# Patient Record
Sex: FEMALE | Race: WHITE | NOT HISPANIC OR LATINO | Employment: PART TIME | URBAN - METROPOLITAN AREA
[De-identification: names, ages, dates, MRNs, and addresses within clinical notes are randomized per-mention and may not be internally consistent; named-entity substitution may affect disease eponyms.]

---

## 2020-10-23 ENCOUNTER — TELEPHONE (OUTPATIENT)
Dept: OBSTETRICS AND GYNECOLOGY | Facility: CLINIC | Age: 35
End: 2020-10-23

## 2020-10-23 NOTE — TELEPHONE ENCOUNTER
Pt called says she just had a visit but wants to know what prenatal vitamins are recommedned or if you all prescribe prenatals. She would like for someone to call her back.

## 2020-10-30 ENCOUNTER — TELEPHONE (OUTPATIENT)
Dept: OBSTETRICS AND GYNECOLOGY | Facility: CLINIC | Age: 35
End: 2020-10-30

## 2020-10-30 ENCOUNTER — OFFICE VISIT (OUTPATIENT)
Dept: OBSTETRICS AND GYNECOLOGY | Facility: CLINIC | Age: 35
End: 2020-10-30

## 2020-10-30 ENCOUNTER — LAB REQUISITION (OUTPATIENT)
Dept: LAB | Facility: HOSPITAL | Age: 35
End: 2020-10-30

## 2020-10-30 VITALS
DIASTOLIC BLOOD PRESSURE: 64 MMHG | SYSTOLIC BLOOD PRESSURE: 102 MMHG | WEIGHT: 131.9 LBS | BODY MASS INDEX: 24.9 KG/M2 | HEIGHT: 61 IN

## 2020-10-30 DIAGNOSIS — Z00.00 ROUTINE GENERAL MEDICAL EXAMINATION AT A HEALTH CARE FACILITY: ICD-10-CM

## 2020-10-30 DIAGNOSIS — O20.0 THREATENED ABORTION: Primary | ICD-10-CM

## 2020-10-30 LAB — HCG INTACT+B SERPL-ACNC: 5.71 MIU/ML

## 2020-10-30 PROCEDURE — 99214 OFFICE O/P EST MOD 30 MIN: CPT | Performed by: NURSE PRACTITIONER

## 2020-10-30 PROCEDURE — 96372 THER/PROPH/DIAG INJ SC/IM: CPT | Performed by: NURSE PRACTITIONER

## 2020-10-30 PROCEDURE — 84702 CHORIONIC GONADOTROPIN TEST: CPT | Performed by: NURSE PRACTITIONER

## 2020-10-30 RX ORDER — PRENATAL VIT NO.126/IRON/FOLIC 28MG-0.8MG
TABLET ORAL DAILY
COMMUNITY

## 2020-10-30 NOTE — PROGRESS NOTES
"Chief Complaint   Patient presents with   • TAB          HPI  Gwendolyn Parker is a 35 y.o. female, , who presents with bleeding.  The amount of bleeding is described as heavy for 1 days with clots starting this morning. and cramping. She reports that bleeding started off as light spotting last night, and has gotten heavier with clots starting this morning.   LMP 20- 4w6d by LMP    Recent Tests:  She had a urine pregnancy test that was done 1 week ago that was positive..  She has not had a pelvic ultrasound. .  She has had prenatal care.  She complains of cramping pain.  The pain is located in her lower abdomen.. Her past medical history is non-contributory.  She does not have passage of tissue.  Rh Status: Negative.  Rhogam will be given today..  She also complains of chills/night sweats for the last three nights. She reports that she has also been having some mild nausea for the last four days in the mornings and with certain foods.     The additional following portions of the patient's history were reviewed and updated as appropriate: allergies, current medications, past family history, past medical history, past social history, past surgical history and problem list.    Review of Systems  Constitutional POS: night sweats    NEG: chills or fevers   Genitourinary POS: vaginal bleeding and cramping    NEG: nothing reported   Gastointestinal POS: nothing reported    NEG: nothing reported   Integument POS: nothing reported    NEG: nothing reported   All other systems reviewed and are negative.     I have reviewed and agree with the HPI, ROS, and historical information as entered above. Nasrin Turner, APRN    Objective   Physical Exam  /64   Ht 154.9 cm (61\")   Wt 59.8 kg (131 lb 14.4 oz)   LMP 2020   Breastfeeding No   BMI 24.92 kg/m²     General:  well developed; well nourished  no acute distress   Skin:  Not performed.   Lungs:  breathing is unlabored   Heart:  Not performed.   Abdomen: " soft, non-tender; no masses   Pelvis: Not performed.        Assessment and Plan    Problem List Items Addressed This Visit     None      Visit Diagnoses     Threatened     -  Primary    Relevant Orders    Rhogam Immune Globulin Immunization (Completed)    HCG, B-subunit, Quantitative          1. Threatened AB  2. Rhogam given today.   3. [facility] stat today, plan to repeat on Mon am stat  4. Discussed potential outcomes including SAB, ectopic, viable pregnancy but further info is needed.   5. Call for fever, bleeding > 1 pad per hour, severe pain    Counseling was given to patient for the following topics: instructions for management, prognosis and impressions . Total time of the encounter was 15 minutes and 15 minutes was spend counseling.      Nasrin Turner, APRN  10/30/2020

## 2020-10-30 NOTE — TELEPHONE ENCOUNTER
Spoke with pt. lmp 9/26/20, periods regular. C/o light spotting yesterday and now heavy with clotting/cramping.     MBT Oneg.     Apt today with NP for possible labs and rhogam. Pt GILDARDO.

## 2020-11-02 ENCOUNTER — LAB (OUTPATIENT)
Dept: OBSTETRICS AND GYNECOLOGY | Facility: CLINIC | Age: 35
End: 2020-11-02

## 2020-11-02 ENCOUNTER — LAB REQUISITION (OUTPATIENT)
Dept: LAB | Facility: HOSPITAL | Age: 35
End: 2020-11-02

## 2020-11-02 DIAGNOSIS — Z00.00 ROUTINE GENERAL MEDICAL EXAMINATION AT A HEALTH CARE FACILITY: ICD-10-CM

## 2020-11-02 LAB — HCG INTACT+B SERPL-ACNC: 1.01 MIU/ML

## 2020-11-02 PROCEDURE — 84702 CHORIONIC GONADOTROPIN TEST: CPT | Performed by: NURSE PRACTITIONER

## 2020-11-03 ENCOUNTER — TELEPHONE (OUTPATIENT)
Dept: OBSTETRICS AND GYNECOLOGY | Facility: CLINIC | Age: 35
End: 2020-11-03

## 2020-11-03 NOTE — TELEPHONE ENCOUNTER
----- Message from Dianne Narayan sent at 11/28/2018  5:10 PM CST -----  Regarding: BOTOX  BOTOX  NO PRIOR AUTHORIZATION REQUIRED PER PLAN GUIDELINES FOR DIAGNOSIS - G81.10      BUY AND BILL ALLOWED    Patient may be scheduled.   Notified pt HCG down from 5.71 to 1.01. discussed SAB with VINITA Turner. Instructed she will sign off and cb if needs repeat or further instructions.     Pt desires to conceive again-instructed best to see what f/u on labs is first and let herself have a cycle before trying again. Start PNV. Pt VU.

## 2020-11-04 NOTE — TELEPHONE ENCOUNTER
Patient is calling because she is unsure if she is needing to come back for a repeat HCG to ensure its at 0

## 2020-11-04 NOTE — PROGRESS NOTES
Patient notified, will not repeat since so close to <1. Wait for next cycle and then can try to conceive again.

## 2020-11-06 LAB
HCG INTACT+B SERPL-ACNC: 1 MIU/ML
HCG INTACT+B SERPL-ACNC: 6 MIU/ML

## 2021-06-02 ENCOUNTER — OFFICE VISIT (OUTPATIENT)
Dept: OBSTETRICS AND GYNECOLOGY | Facility: CLINIC | Age: 36
End: 2021-06-02

## 2021-06-02 VITALS
DIASTOLIC BLOOD PRESSURE: 78 MMHG | WEIGHT: 134.6 LBS | BODY MASS INDEX: 24.77 KG/M2 | HEIGHT: 62 IN | SYSTOLIC BLOOD PRESSURE: 102 MMHG

## 2021-06-02 DIAGNOSIS — Z01.419 ENCOUNTER FOR ANNUAL ROUTINE GYNECOLOGICAL EXAMINATION: Primary | ICD-10-CM

## 2021-06-02 PROCEDURE — 99395 PREV VISIT EST AGE 18-39: CPT | Performed by: OBSTETRICS & GYNECOLOGY

## 2021-06-02 NOTE — PROGRESS NOTES
GYN Annual Exam     CC - Here for annual exam.     Subjective   HPI  Gwendolyn Parker is a 36 y.o. female, , who presents for annual well woman exam.  Her last LMP was Patient's last menstrual period was 2021 (exact date)..  Periods are regular every 28-30 days, lasting 7 days.  Dysmenorrhea:mild, occurring first 1-2 days of flow.  Patient reports problems with: bloating, breast tenderness, fluid retention, moodiness and pelvic pain.  Partner Status: Marital Status: .  New Partners since last visit: YES/NO/Other: no.  Desires STD Screening: YES/NO/Other: no.     Patient would like to discuss the ages for mammograms and getting one due to family history of breast cancer.      Additional OB/GYN History   Current contraception: none  Last Pap : 2020  Last Completed Pap Smear          Ordered - PAP SMEAR (Every 3 Years) Ordered on 2020  Done - hpv neg, epithelial Lisa abnormality (ASC)              History of abnormal Pap smear: yes  Family history of uterine, colon, breast, or ovarian cancer: breast cancer, ovarian cancer   Performs monthly Self-Breast Exam: some  Last mammogram: never performed   Last Completed Mammogram     This patient has no relevant Health Maintenance data.        Feelings of Anxiety or Depression: anxiety, situational   Tobacco Usage?: No   OB History        3    Para   2    Term   2       0    AB   1    Living   2       SAB   1    TAB        Ectopic        Molar        Multiple        Live Births   2                Health Maintenance   Topic Date Due   • Annual Gynecologic Pelvic and Breast Exam  Never done   • ANNUAL PHYSICAL  Never done   • TDAP/TD VACCINES (1 - Tdap) Never done   • HEPATITIS C SCREENING  Never done   • INFLUENZA VACCINE  2021   • PAP SMEAR  2023   • COVID-19 Vaccine  Completed   • Pneumococcal Vaccine 0-64  Aged Out         Current Outpatient Medications:   •  prenatal vitamin (prenatal, CLASSIC, vitamin)  "tablet, Take  by mouth Daily., Disp: , Rfl:     The additional following portions of the patient's history were reviewed and updated as appropriate: allergies, current medications, past family history, past medical history, past social history, past surgical history and problem list.    Review of Systems    I have reviewed and agree with the HPI, ROS, and historical information as entered above. Efe Villegas MD    Objective   /78   Ht 157.5 cm (62\")   Wt 61.1 kg (134 lb 9.6 oz)   LMP 05/20/2021 (Exact Date)   Breastfeeding No   BMI 24.62 kg/m²     PE    Breast: Without masses ,nontender, no skin changes or retractions  Axilla: Normal, no lymphadenopathy  Heart: Regular rate no murmurs rubs or gallops  Lungs: Clear to auscultation, normal breath sounds bilaterally  Abdomen: Soft nontender, no hepatosplenomegaly, no guarding or rebound, no masses  Pelvic exam  External genitalia: Normal introitus and vulva  Vagina: Normal mucosa no bleeding inflammation or discharge  Bladder: Normal position nontender  Urethral meatus and urethra: Normal nontender  Cervix: No lesions, no discharge, bleeding or inflammation  Bimanual: Nontender adnexa clear, no sign of uterine or ovarian enlargement  Anal: No external lesions or hemorrhoids         Assessment/Plan     Assessment     Problem List Items Addressed This Visit        Genitourinary and Reproductive     Encounter for annual routine gynecological examination - Primary    Relevant Orders    IGP, Rfx Aptima HPV ASCU          1. GYN annual well woman exam.   2. Recent miscarriage patient may want to try 1 more time; reviewed risk and reviewed implications of maternal age    Plan     1. Next pap due in: 2 to 3-year  2. Fibrocystic breast changes - Encouraged decreasing caffeine, supportive bra, low dose vitamin E supplementation.  3. Reviewed HPV guidelines  4. Reviewed monthly self breast exams.  Instructed to call with lumps, pain, or breast discharge.  Yearly " mammograms ordered.  5. Preconceptual Counseling - Discussed cystic fibrosis screening, rubella screening, chicken pox immunity, folic acid supplementation and HIV screening.  ACOG pamphlet on good health before pregnancy given to patient.  Healthy lifestyle changes including diet and exercise reviewed    Efe Villegas MD  06/02/2021

## 2021-06-10 DIAGNOSIS — Z01.419 ENCOUNTER FOR ANNUAL ROUTINE GYNECOLOGICAL EXAMINATION: ICD-10-CM

## 2022-06-13 ENCOUNTER — OFFICE VISIT (OUTPATIENT)
Dept: OBSTETRICS AND GYNECOLOGY | Facility: CLINIC | Age: 37
End: 2022-06-13

## 2022-06-13 VITALS
BODY MASS INDEX: 22.97 KG/M2 | HEIGHT: 62 IN | SYSTOLIC BLOOD PRESSURE: 100 MMHG | WEIGHT: 124.8 LBS | DIASTOLIC BLOOD PRESSURE: 66 MMHG

## 2022-06-13 DIAGNOSIS — Z01.419 WOMEN'S ANNUAL ROUTINE GYNECOLOGICAL EXAMINATION: ICD-10-CM

## 2022-06-13 DIAGNOSIS — Z01.419 ENCOUNTER FOR ANNUAL ROUTINE GYNECOLOGICAL EXAMINATION: Primary | ICD-10-CM

## 2022-06-13 PROCEDURE — 99395 PREV VISIT EST AGE 18-39: CPT | Performed by: OBSTETRICS & GYNECOLOGY

## 2022-06-13 RX ORDER — RIBOFLAVIN (VITAMIN B2) 100 MG
TABLET ORAL
COMMUNITY

## 2022-06-13 NOTE — PROGRESS NOTES
GYN Annual Exam     CC - Here for annual exam.        HPI  Gwendolyn Parker is a 37 y.o. female, , who presents for annual well woman exam. Patient's last menstrual period was 2022..  Periods are regular every 25-35 days, lasting 6-7 days. .  Dysmenorrhea:moderate, occurring first 1-2 days of flow.  Patient reports problems with: none.  There were no changes to her medical or surgical history since her last visit.. Partner Status: Marital Status: .  She is sexually active. She has not had new partners since her last STD testing. . STD testing recommendations have been explained to the patient and she does not desire STD testing.    Additional OB/GYN History   Current contraception: contraceptive methods: Withdrawal   Desires to: do not start contraception  Last Pap : 2021. Result: Negative without HPV testing   Last Completed Pap Smear          Ordered - PAP SMEAR (Every 3 Years) Ordered on 2021  SCANNED - PAP SMEAR    2020  Done - hpv neg, epithelial Lisa abnormality (ASC)              History of abnormal Pap smear: no  Family history of uterine, colon, breast, or ovarian cancer: yes - PGM-Breast and Ovarian cancer  Performs monthly Self-Breast Exam: no  Exercises Regularly:yes  Feelings of Anxiety or Depression: no  Tobacco Usage?: No   OB History        3    Para   2    Term   2       0    AB   1    Living   2       SAB   1    IAB        Ectopic        Molar        Multiple        Live Births   2                Health Maintenance   Topic Date Due   • ANNUAL PHYSICAL  Never done   • COVID-19 Vaccine (1) Never done   • TDAP/TD VACCINES (1 - Tdap) Never done   • HEPATITIS C SCREENING  Never done   • Annual Gynecologic Pelvic and Breast Exam  2022   • INFLUENZA VACCINE  2022   • PAP SMEAR  2024   • Pneumococcal Vaccine 0-64  Aged Out       The additional following portions of the patient's history were reviewed and updated as appropriate:  "allergies, current medications, past family history, past medical history, past social history, past surgical history and problem list.    Review of Systems   Constitutional: Negative.    HENT: Negative.    Eyes: Negative.    Respiratory: Negative.    Cardiovascular: Negative.    Gastrointestinal: Negative.    Endocrine: Negative.    Genitourinary: Negative.    Musculoskeletal: Negative.    Skin: Negative.    Allergic/Immunologic: Negative.    Neurological: Negative.    Hematological: Negative.    Psychiatric/Behavioral: Negative.          I have reviewed and agree with the HPI, ROS, and historical information as entered above. Sultana De Luna MD    Objective   /66   Ht 157.5 cm (62\")   Wt 56.6 kg (124 lb 12.8 oz)   LMP 06/08/2022   BMI 22.83 kg/m²     Physical Exam  Vitals and nursing note reviewed. Exam conducted with a chaperone present.   Constitutional:       Appearance: She is well-developed.   HENT:      Head: Normocephalic and atraumatic.   Neck:      Thyroid: No thyroid mass or thyromegaly.   Cardiovascular:      Rate and Rhythm: Normal rate and regular rhythm.      Heart sounds: No murmur heard.  Pulmonary:      Effort: Pulmonary effort is normal. No retractions.      Breath sounds: Normal breath sounds. No wheezing, rhonchi or rales.   Chest:      Chest wall: No mass or tenderness.   Breasts:      Right: Normal. No mass, nipple discharge, skin change or tenderness.      Left: Normal. No mass, nipple discharge, skin change or tenderness.       Abdominal:      General: Bowel sounds are normal.      Palpations: Abdomen is soft. Abdomen is not rigid. There is no mass.      Tenderness: There is no abdominal tenderness. There is no guarding.      Hernia: No hernia is present. There is no hernia in the left inguinal area or right inguinal area.   Genitourinary:     General: Normal vulva.      Exam position: Lithotomy position.      Pubic Area: No rash.       Labia:         Right: No rash, " tenderness or lesion.         Left: No rash, tenderness or lesion.       Urethra: No urethral pain or urethral swelling.      Vagina: Normal. No vaginal discharge or lesions.      Cervix: No cervical motion tenderness, discharge, lesion or cervical bleeding.      Uterus: Normal. Not enlarged, not fixed and not tender.       Adnexa:         Right: No mass, tenderness or fullness.          Left: No mass, tenderness or fullness.        Rectum: No external hemorrhoid.   Musculoskeletal:      Cervical back: Normal range of motion. No muscular tenderness.   Neurological:      Mental Status: She is alert and oriented to person, place, and time.   Psychiatric:         Behavior: Behavior normal.            Assessment and Plan    Problem List Items Addressed This Visit        Genitourinary and Reproductive     Encounter for annual routine gynecological examination - Primary      Other Visit Diagnoses     Women's annual routine gynecological examination        Relevant Orders    LIQUID-BASED PAP SMEAR, [facility] ([facility])          1. GYN annual well woman exam.   2. Preconceptual Counseling.  Discussed timed intercourse, regular cycles, prenatal vitamins, and when to call.  3. Reviewed monthly self breast exams.  Instructed to call with lumps, pain, or breast discharge.    4. Reviewed exercise as a preventative health measures.   5. Recommended use of Vitamin D replacement and getting adequate calcium in her diet. (1500mg)  6. Reccommended Flu Vaccine in Fall of each year.  7. RTC in 1 year or PRN with problems  Return in about 1 year (around 6/13/2023) for Annual physical.      Sultana De Luna MD  06/13/2022

## 2022-06-17 LAB — REF LAB TEST METHOD: NORMAL

## 2022-12-09 ENCOUNTER — TELEPHONE (OUTPATIENT)
Dept: OBSTETRICS AND GYNECOLOGY | Facility: CLINIC | Age: 37
End: 2022-12-09

## 2022-12-09 NOTE — TELEPHONE ENCOUNTER
Pt stated she wanted to speak to a nurse  Pt stated there is a small chance she has conceived but is too early to test; pt stated she is flu positive and the tylenol is not taking her fever down asked if safe to take ibu this early or what her alternatives are as her fever will not resolve and has been running at 101 and continuing to go higher. Pt stated she is also maxed out on tylenol for 24 hours

## 2022-12-09 NOTE — TELEPHONE ENCOUNTER
Dr. De Luna patient   LMP: 11/24/2022    S/w patient- patient states that she is actively TTC and believes that she conceived this week with +OPK. Informed patient that she can take Tylenol and Motrin until +UPT. I advised patient to go to ER if her fever reaches above 102-103. She v/u.

## 2023-02-13 ENCOUNTER — TELEPHONE (OUTPATIENT)
Dept: OBSTETRICS AND GYNECOLOGY | Facility: CLINIC | Age: 38
End: 2023-02-13
Payer: COMMERCIAL

## 2023-02-13 NOTE — TELEPHONE ENCOUNTER
DELETE AFTER REVIEWING: Telephone encounter to be sent to the clinical pool   Hub staff attempted to follow warm transfer process and was unsuccessful     Caller:  Gwendolyn    Relationship to patient: self    Best call back number: [phone]    Patient is needing: PT WOULD LIKE TO HAVE A NURSE CALL HER.  HER CYCLE IS LATE AND SHE HAS NEVER BEEN LATE BEFORE.  HOME PREGNANCY TEST ARE NEGATIVE AS OF 02.13.2023.

## 2023-02-13 NOTE — TELEPHONE ENCOUNTER
LMP 1/11/23. Discussed not cause for concern if only 1 month of a missed period. Recommended she take another UPT after being 1 week late and if negative continue to monitor, if no period by the time her next period is due, call back and come in. She VU

## 2023-02-20 ENCOUNTER — TELEPHONE (OUTPATIENT)
Dept: OBSTETRICS AND GYNECOLOGY | Facility: CLINIC | Age: 38
End: 2023-02-20
Payer: COMMERCIAL

## 2023-02-20 NOTE — TELEPHONE ENCOUNTER
Pt calling because she is 2 wks late for her period to start and she is always regular. She has taken multiple pregnancy tests and they are all negative.

## 2023-02-20 NOTE — TELEPHONE ENCOUNTER
S/w pt she states she is currently 2 weeks late for having her menses and states she has never been late in her life. Patient states she has been having sore breasts and mild cramping that is intermittent. Patient states she has taken multiple UPTs that have been negative. She previously had COVID in December 2022 and her menses since then have been normal.     Patient denies: increased stress, diet change, increased exercise.     Patient is concerned and requested to come into the office for an appt and possible labs.     Appt. Scheduled for this coming Wednesday (per pt. Request)

## 2023-02-22 ENCOUNTER — OFFICE VISIT (OUTPATIENT)
Dept: OBSTETRICS AND GYNECOLOGY | Facility: CLINIC | Age: 38
End: 2023-02-22
Payer: COMMERCIAL

## 2023-02-22 VITALS
DIASTOLIC BLOOD PRESSURE: 62 MMHG | BODY MASS INDEX: 23.77 KG/M2 | HEIGHT: 62 IN | WEIGHT: 129.2 LBS | SYSTOLIC BLOOD PRESSURE: 98 MMHG

## 2023-02-22 DIAGNOSIS — N91.2 AMENORRHEA: Primary | ICD-10-CM

## 2023-02-22 PROCEDURE — 99213 OFFICE O/P EST LOW 20 MIN: CPT

## 2023-02-22 NOTE — PROGRESS NOTES
Chief Complaint   Patient presents with   • Menstrual Problem       Subjective   HPI  Gwendolyn Parker is a 37 y.o. female, . Her last LMP was Patient's last menstrual period was 2022 (exact date).. who presents for absent menses. Patient stated she is currently 2 weeks late for having her menses and states she has never been late in her life. Patient states she has been having sore breasts and mild cramping that is intermittent. Patient states she has taken multiple UPTs that have been negative. She previously had COVID in 2022 and her menses since then have been normal.     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 She uses raine to decided when not to have IC.      Patient denies: increased stress, diet change, increased exercise.         Additional OB/GYN History     Last Pap : 2022-Neg HPV Neg   Last Completed Pap Smear          PAP SMEAR (Every 3 Years) Next due on 2022  LIQUID-BASED PAP SMEAR, [facility] ([facility])    2021  SCANNED - PAP SMEAR    2020  Done - hpv neg, epithelial Lisa abnormality (ASC)                Last mammogram: Never  Last Completed Mammogram     This patient has no relevant Health Maintenance data.          Tobacco Usage?: No   OB History        3    Para   2    Term   2       0    AB   1    Living   2       SAB   1    IAB        Ectopic        Molar        Multiple        Live Births   2                  Current Outpatient Medications:   •  ascorbic acid (VITAMIN C) 100 MG tablet, Take  by mouth., Disp: , Rfl:   •  prenatal vitamin (prenatal, CLASSIC, vitamin) tablet, Take  by mouth Daily., Disp: , Rfl:   •  VITAMIN D PO, Take  by mouth., Disp: , Rfl:      Past Medical History:   Diagnosis Date   • Anxiety    • Bell's palsy     DURING 2ND PREGNANCY THIRD TRIMESTER   • Blood type, Rh negative    • Dysmenorrhea    • Miscarriage     2020   • MRSA infection     EYE RIGHT SAME EYE AS BELLS PALSY   • Rh  "incompatibility         Past Surgical History:   Procedure Laterality Date   • FINGER NAIL SURGERY     • INTRAUTERINE DEVICE INSERTION  06/02/2014    Mirena, removed 07/14/2017   • WISDOM TOOTH EXTRACTION         The additional following portions of the patient's history were reviewed and updated as appropriate: allergies and current medications.    Review of Systems   Respiratory: Negative.  Negative for shortness of breath.    Cardiovascular: Negative.  Negative for chest pain.   Gastrointestinal: Negative.  Negative for abdominal distention, abdominal pain and constipation.   Genitourinary: Positive for breast pain (breast fullness like cycle is going to start) and pelvic pain (menstrual like cramps). Negative for dyspareunia, dysuria, pelvic pressure, vaginal bleeding, vaginal discharge and vaginal pain.       I have reviewed and agree with the HPI, ROS, and historical information as entered above. Julissa Garcia, APRN    Objective   BP 98/62 (BP Location: Right arm, Patient Position: Sitting, Cuff Size: Adult)   Ht 157.5 cm (62.01\")   Wt 58.6 kg (129 lb 3.2 oz)   LMP 01/11/2022 (Exact Date)   BMI 23.62 kg/m²     Physical Exam  Vitals and nursing note reviewed.   Constitutional:       Appearance: Normal appearance.   Pulmonary:      Effort: Pulmonary effort is normal.   Neurological:      Mental Status: She is alert and oriented to person, place, and time.   Psychiatric:         Mood and Affect: Mood normal.         Behavior: Behavior normal.         Assessment & Plan     Assessment     Problem List Items Addressed This Visit        Genitourinary and Reproductive     Amenorrhea - Primary    Relevant Orders    HCG, B-subunit, Quantitative    TSH Rfx On Abnormal To Free T4    CBC (No Diff)    Follicle Stimulating Hormone    Estradiol    Testosterone - total    Prolactin    DHEA-Sulfate       Plan   1. Labs ordered. Last time having labs drawn was 8/2021 per pt.   2. We reviewed potential causes of Amenorrhea " including perimenopause, pregnancy, changes in diet/weight, stress, illnesses, endocrine problems.   3. Patient informed if labs are all normal, we will have her come back for further evaluation. She has also been informed the delay in her cycle could be related to recent COVID diagnosis.   4. Will notify patient when labs return and plan accordingly.  5. Return in about 4 months (around 6/15/2023) for Annual physical or sooner if needed .        Julissa Garcia, APRN  02/22/2023

## 2023-02-23 LAB
DHEA-S SERPL-MCNC: 220 UG/DL (ref 57.3–279.2)
ERYTHROCYTE [DISTWIDTH] IN BLOOD BY AUTOMATED COUNT: 12.3 % (ref 12.3–15.4)
ESTRADIOL SERPL-MCNC: 72.2 PG/ML
FSH SERPL-ACNC: 6.9 MIU/ML
HCG INTACT+B SERPL-ACNC: <1 MIU/ML
HCT VFR BLD AUTO: 39 % (ref 34–46.6)
HGB BLD-MCNC: 13.3 G/DL (ref 12–15.9)
MCH RBC QN AUTO: 30.8 PG (ref 26.6–33)
MCHC RBC AUTO-ENTMCNC: 34.1 G/DL (ref 31.5–35.7)
MCV RBC AUTO: 90.3 FL (ref 79–97)
PLATELET # BLD AUTO: 293 10*3/MM3 (ref 140–450)
PROLACTIN SERPL-MCNC: 10.5 NG/ML (ref 4.8–23.3)
RBC # BLD AUTO: 4.32 10*6/MM3 (ref 3.77–5.28)
TESTOST SERPL-MCNC: 16 NG/DL (ref 8–60)
TSH SERPL DL<=0.005 MIU/L-ACNC: 1.16 UIU/ML (ref 0.27–4.2)
WBC # BLD AUTO: 6.9 10*3/MM3 (ref 3.4–10.8)

## 2023-05-03 DIAGNOSIS — R30.0 DYSURIA: Primary | ICD-10-CM

## 2023-05-03 RX ORDER — NITROFURANTOIN 25; 75 MG/1; MG/1
100 CAPSULE ORAL 2 TIMES DAILY
Qty: 14 CAPSULE | Refills: 0 | Status: SHIPPED | OUTPATIENT
Start: 2023-05-03

## 2024-06-27 ENCOUNTER — OFFICE VISIT (OUTPATIENT)
Dept: OBSTETRICS AND GYNECOLOGY | Facility: CLINIC | Age: 39
End: 2024-06-27
Payer: COMMERCIAL

## 2024-06-27 VITALS
SYSTOLIC BLOOD PRESSURE: 100 MMHG | HEIGHT: 62 IN | WEIGHT: 128.7 LBS | DIASTOLIC BLOOD PRESSURE: 64 MMHG | BODY MASS INDEX: 23.68 KG/M2

## 2024-06-27 DIAGNOSIS — Z01.419 WOMEN'S ANNUAL ROUTINE GYNECOLOGICAL EXAMINATION: Primary | ICD-10-CM

## 2024-06-27 DIAGNOSIS — Z12.31 BREAST CANCER SCREENING BY MAMMOGRAM: ICD-10-CM

## 2024-06-27 NOTE — PROGRESS NOTES
Gynecologic Annual Exam Note        Gynecologic Exam        Subjective     HPI  Gwendolyn Parker is a 39 y.o.  female who presents for annual well woman exam as a established patient. There were no changes to her medical or surgical history since her last visit.. Patient's last menstrual period was 2024 (exact date). Her periods occur every 25-35 days, lasting 6-7 days.  The flow is heavy first 2 days(changing pad/tampon every 2-3 hours) then moderate. She reports dysmenorrhea is mild occurring first 1-2 days of flow. Marital Status: .  She is sexually active. She has not had new partners.. STD testing recommendations have been explained to the patient and she does not desire STD testing.    The patient would like to discuss the following complaints today: none    Additional OB/GYN History   contraceptive methods: Rhythm method  Desires to: do not start contraception  Thromboembolic Disease: none  History of migraines:  used to but has not gotten in long time      History of STD: no    Last Pap : 2022. Results: negative. HPV: negative.   Last Completed Pap Smear            PAP SMEAR (Every 3 Years) Next due on 2022  LIQUID-BASED PAP SMEAR, [facility] ([facility])    2021  SCANNED - PAP SMEAR    2020  Done - hpv neg, epithelial Lisa abnormality (ASC)                     History of abnormal Pap smear: no  Gardasil status:completed  Family history of uterine, colon, breast, or ovarian cancer: yes - PGM-ovarian and breast  Performs monthly Self-Breast Exam: yes sometimes  Exercises Regularly:yes  Feelings of Anxiety or Depression: yes anxiety self manageable  Tobacco Usage?: No       Current Outpatient Medications:     prenatal vitamin (prenatal, CLASSIC, vitamin) tablet, Take  by mouth Daily., Disp: , Rfl:      Patient denies the need for medication refills today.    OB History          3    Para   2    Term   2       0    AB   1    Living   2     "     SAB   1    IAB        Ectopic        Molar        Multiple        Live Births   2                Health Maintenance   Topic Date Due    MAMMOGRAM  Never done    TDAP/TD VACCINES (1 - Tdap) Never done    HEPATITIS C SCREENING  Never done    ANNUAL PHYSICAL  Never done    COVID-19 Vaccine (4 - 2023-24 season) 09/01/2023    Annual Gynecologic Pelvic and Breast Exam  06/23/2024    INFLUENZA VACCINE  08/01/2024    PAP SMEAR  06/13/2025    Pneumococcal Vaccine 0-64  Aged Out       Past Medical History:   Diagnosis Date    Anxiety     Bell's palsy     DURING 2ND PREGNANCY THIRD TRIMESTER    Blood type, Rh negative     Dysmenorrhea     Miscarriage     october 2020    MRSA infection     EYE RIGHT SAME EYE AS BELLS PALSY    Rh incompatibility         Past Surgical History:   Procedure Laterality Date    FINGER NAIL SURGERY      INTRAUTERINE DEVICE INSERTION  06/02/2014    Mirena, removed 07/14/2017    WISDOM TOOTH EXTRACTION         The additional following portions of the patient's history were reviewed and updated as appropriate: allergies, current medications, past family history, past medical history, past social history, past surgical history, and problem list.    Review of Systems   Constitutional: Negative.    HENT: Negative.     Eyes: Negative.    Respiratory: Negative.     Cardiovascular: Negative.    Gastrointestinal: Negative.    Endocrine: Negative.    Genitourinary: Negative.    Musculoskeletal: Negative.    Skin: Negative.    Allergic/Immunologic: Negative.    Neurological: Negative.    Hematological: Negative.    Psychiatric/Behavioral: Negative.           I have reviewed and agree with the HPI, ROS, and historical information as entered above. Sultana De Luna MD          Objective   /64   Ht 157.5 cm (62\")   Wt 58.4 kg (128 lb 11.2 oz)   LMP 06/18/2024 (Exact Date)   BMI 23.54 kg/m²     Physical Exam  Vitals and nursing note reviewed. Exam conducted with a chaperone present. "   Constitutional:       Appearance: She is well-developed.   HENT:      Head: Normocephalic and atraumatic.   Neck:      Thyroid: No thyroid mass or thyromegaly.   Cardiovascular:      Rate and Rhythm: Normal rate and regular rhythm.      Heart sounds: No murmur heard.  Pulmonary:      Effort: Pulmonary effort is normal. No retractions.      Breath sounds: Normal breath sounds. No wheezing, rhonchi or rales.   Chest:      Chest wall: No mass or tenderness.   Breasts:     Right: Normal. No mass, nipple discharge, skin change or tenderness.      Left: Normal. No mass, nipple discharge, skin change or tenderness.   Abdominal:      General: Bowel sounds are normal.      Palpations: Abdomen is soft. Abdomen is not rigid. There is no mass.      Tenderness: There is no abdominal tenderness. There is no guarding.      Hernia: No hernia is present. There is no hernia in the left inguinal area or right inguinal area.   Genitourinary:     General: Normal vulva.      Exam position: Lithotomy position.      Pubic Area: No rash.       Labia:         Right: No rash, tenderness or lesion.         Left: No rash, tenderness or lesion.       Urethra: No urethral pain or urethral swelling.      Vagina: Normal. No vaginal discharge or lesions.      Cervix: No cervical motion tenderness, discharge, lesion or cervical bleeding.      Uterus: Normal. Not enlarged, not fixed and not tender.       Adnexa:         Right: No mass, tenderness or fullness.          Left: No mass, tenderness or fullness.        Rectum: No external hemorrhoid.   Musculoskeletal:      Cervical back: Normal range of motion. No muscular tenderness.   Neurological:      Mental Status: She is alert and oriented to person, place, and time.   Psychiatric:         Behavior: Behavior normal.            Assessment and Plan    Problem List Items Addressed This Visit    None  Visit Diagnoses       Women's annual routine gynecological examination    -  Primary    Breast cancer  screening by mammogram        Relevant Orders    Mammo Screening Digital Tomosynthesis Bilateral With CAD            GYN annual well woman exam.   Reviewed pap guidelines.   Recommended use of Vitamin D replacement and getting adequate calcium in her diet. (1500mg)  Reviewed monthly self breast exams.  Instructed to call with lumps, pain, or breast discharge.    Reviewed BMI and weight loss as preventative health measures.   Reviewed exercise as a preventative health measures.   Reccommended Flu Vaccine in Fall of each year.  RTC in 1 year or PRN with problems  Return in about 1 year (around 6/27/2025) for Annual physical.    Sultana De Luna MD  06/27/2024

## 2025-06-27 ENCOUNTER — HOSPITAL ENCOUNTER (OUTPATIENT)
Dept: MAMMOGRAPHY | Facility: HOSPITAL | Age: 40
Discharge: HOME | End: 2025-06-27
Admitting: OBSTETRICS & GYNECOLOGY
Payer: COMMERCIAL

## 2025-06-27 DIAGNOSIS — Z12.31 BREAST CANCER SCREENING BY MAMMOGRAM: ICD-10-CM

## 2025-06-27 PROCEDURE — 77067 SCR MAMMO BI INCL CAD: CPT

## 2025-06-27 PROCEDURE — 77063 BREAST TOMOSYNTHESIS BI: CPT

## 2025-07-04 LAB
NCCN CRITERIA FLAG: ABNORMAL
TYRER CUZICK SCORE: 16.8

## 2025-07-07 ENCOUNTER — RESULTS FOLLOW-UP (OUTPATIENT)
Facility: HOSPITAL | Age: 40
End: 2025-07-07
Payer: COMMERCIAL

## 2025-07-07 NOTE — PROGRESS NOTES
This patient recently completed the CARE risk assessment for a mammogram appointment. Based on the patient's responses, NCCN criteria for genetic testing was met. At the time of the assessment, the patient was provided with both written and video educational materials regarding genetic testing.    Navigator follow-up:   I spoke to the patient and she is interested in testing, but would like some more information and time to consider it.  I have sent the patient a Couple message with more details about the program and told her to reach back out if she would like to proceed.

## 2025-07-09 ENCOUNTER — HOSPITAL ENCOUNTER (OUTPATIENT)
Facility: HOSPITAL | Age: 40
Discharge: HOME | End: 2025-07-09
Payer: COMMERCIAL

## 2025-07-09 DIAGNOSIS — R92.8 ABNORMAL MAMMOGRAM: ICD-10-CM

## 2025-07-09 PROCEDURE — 76642 ULTRASOUND BREAST LIMITED: CPT | Performed by: RADIOLOGY

## 2025-07-09 PROCEDURE — 77065 DX MAMMO INCL CAD UNI: CPT | Performed by: RADIOLOGY

## 2025-07-09 PROCEDURE — 77061 BREAST TOMOSYNTHESIS UNI: CPT | Performed by: RADIOLOGY

## 2025-07-09 PROCEDURE — 76642 ULTRASOUND BREAST LIMITED: CPT

## 2025-07-09 PROCEDURE — G0279 TOMOSYNTHESIS, MAMMO: HCPCS

## 2025-07-09 PROCEDURE — 77065 DX MAMMO INCL CAD UNI: CPT

## 2025-07-11 ENCOUNTER — DOCUMENTATION (OUTPATIENT)
Dept: GENETICS | Facility: HOSPITAL | Age: 40
End: 2025-07-11
Payer: COMMERCIAL

## 2025-07-11 NOTE — PROGRESS NOTES
Meets NCCN Criteria for Genetic Testing  Declines genetic testing? Y   Reason for decline? Could Not Reach Patient (Spoke to patient but she would like to think about it)   If Reason for Decline is Previous Testing    [place] CARE     Non-CARE     Non-CARE Confirmation    Navigator Confirmed?     Patient Reported?     Elevated Tyrer-Cuzick Score ? Or Equal to 20%    Declines referral?     Reason for decline?

## 2025-07-11 NOTE — PROGRESS NOTES
I have attempted to contact the patient via [employer]flavor message and phone call to discuss the risk assessment results. The patient has not yet responded. My contact information was included in both the [employer]flavor message and voicemail.